# Patient Record
Sex: FEMALE | Race: ASIAN | NOT HISPANIC OR LATINO
[De-identification: names, ages, dates, MRNs, and addresses within clinical notes are randomized per-mention and may not be internally consistent; named-entity substitution may affect disease eponyms.]

---

## 2023-06-27 PROBLEM — Z00.00 ENCOUNTER FOR PREVENTIVE HEALTH EXAMINATION: Status: ACTIVE | Noted: 2023-06-27

## 2023-07-11 ENCOUNTER — APPOINTMENT (OUTPATIENT)
Dept: SURGICAL ONCOLOGY | Facility: CLINIC | Age: 43
End: 2023-07-11
Payer: MEDICAID

## 2023-07-11 VITALS
DIASTOLIC BLOOD PRESSURE: 85 MMHG | TEMPERATURE: 98 F | HEART RATE: 90 BPM | WEIGHT: 113 LBS | HEIGHT: 62.2 IN | OXYGEN SATURATION: 99 % | BODY MASS INDEX: 20.53 KG/M2 | SYSTOLIC BLOOD PRESSURE: 147 MMHG | RESPIRATION RATE: 18 BRPM

## 2023-07-11 DIAGNOSIS — Z78.9 OTHER SPECIFIED HEALTH STATUS: ICD-10-CM

## 2023-07-11 PROCEDURE — 99245 OFF/OP CONSLTJ NEW/EST HI 55: CPT

## 2023-07-11 NOTE — HISTORY OF PRESENT ILLNESS
[de-identified] : Ms. Lopez is a 42 y/o female who developed right sided abdominal pain with nausea, but without associated emesis/fever.\par Initial concern was possible biliary colic.\par Patient underwent CT abdomen/pelvis 06/23/2023 which demonstrated thickening in the ascending colon, measuring 4.2 cm,  concerning for malignant mass or inflammatory process.  There was no evidence of distant metastasis if malignant mass.\par \par Colonoscopy 06/28/2023 showed an apple core lesion in the ascending colon for which colonoscope could not traverse.  Biopsy and tattoo was performed.\par Pathology: superficial fragments of tubulovillous adenoma, but deeper adenocarcinoma could not be excluded.\par CEA is <0.6 ng/mL.\par \par CT chest 07/06/2023 shows no evidence of thoracic metastatic disease.\par \par She is referred for surgical evaluation.\par Patient currently feels well and is tolerating diet without abdominal bloating, nausea/emesis.  She has not had weight loss.

## 2023-07-11 NOTE — CONSULT LETTER
[Dear  ___] : Dear  [unfilled], [Consult Letter:] : I had the pleasure of evaluating your patient, [unfilled]. [Please see my note below.] : Please see my note below. [Consult Closing:] : Thank you very much for allowing me to participate in the care of this patient.  If you have any questions, please do not hesitate to contact me. [Sincerely,] : Sincerely, [FreeTextEntry2] : Dr. Harry Kidd [FreeTextEntry3] : Quinn Kearns\par (M) [phone]\par (O) [phone] [DrPop  ___] : Dr. SALAZAR

## 2023-07-11 NOTE — REASON FOR VISIT
[Initial Consultation] : an initial consultation for [Referred By: ___] : Referred By: [unfilled] [FreeTextEntry2] : ascending colon mass

## 2023-07-11 NOTE — ASSESSMENT
[FreeTextEntry1] : 42 y/o female presents with near obstructing ascending colon mass.\par I suspect there is an underlying adenocarcinoma.\par No evidence of distant metastasis.\par There is concern for impending obstruction.\par \par Plan: Recommend surgical resection - right colectomy.  She is a candidate for minimally invasive robotic approach.  She will need preoperative bowel preparation.\par Risks/benefits/operative details and expected post-operative recovery discussed exhaustively.\par All question answered.\par She wishes to proceed and I will expedite surgical date this month.

## 2023-07-12 RX ORDER — NEOMYCIN SULFATE 500 MG/1
500 TABLET ORAL
Qty: 6 | Refills: 0 | Status: ACTIVE | COMMUNITY
Start: 2023-07-12 | End: 1900-01-01

## 2023-07-12 RX ORDER — POTASSIUM CHLORIDE 1500 MG/1
20 TABLET, FILM COATED, EXTENDED RELEASE ORAL
Qty: 4 | Refills: 0 | Status: ACTIVE | COMMUNITY
Start: 2023-07-12 | End: 1900-01-01

## 2023-07-12 RX ORDER — METRONIDAZOLE 250 MG/1
250 TABLET ORAL
Qty: 3 | Refills: 0 | Status: ACTIVE | COMMUNITY
Start: 2023-07-12 | End: 1900-01-01

## 2023-07-16 ENCOUNTER — TRANSCRIPTION ENCOUNTER (OUTPATIENT)
Age: 43
End: 2023-07-16

## 2023-07-17 ENCOUNTER — RESULT REVIEW (OUTPATIENT)
Age: 43
End: 2023-07-17

## 2023-07-17 ENCOUNTER — OUTPATIENT (OUTPATIENT)
Dept: OUTPATIENT SERVICES | Facility: HOSPITAL | Age: 43
LOS: 1 days | End: 2023-07-17
Payer: COMMERCIAL

## 2023-07-17 DIAGNOSIS — K63.5 POLYP OF COLON: ICD-10-CM

## 2023-07-17 DIAGNOSIS — K64.8 OTHER HEMORRHOIDS: ICD-10-CM

## 2023-07-17 DIAGNOSIS — R10.30 LOWER ABDOMINAL PAIN, UNSPECIFIED: ICD-10-CM

## 2023-07-17 DIAGNOSIS — K62.1 RECTAL POLYP: ICD-10-CM

## 2023-07-17 DIAGNOSIS — R93.3 ABNORMAL FINDINGS ON DIAGNOSTIC IMAGING OF OTHER PARTS OF DIGESTIVE TRACT: ICD-10-CM

## 2023-07-17 PROCEDURE — 88321 CONSLTJ&REPRT SLD PREP ELSWR: CPT

## 2023-07-19 ENCOUNTER — OUTPATIENT (OUTPATIENT)
Dept: OUTPATIENT SERVICES | Facility: HOSPITAL | Age: 43
LOS: 1 days | End: 2023-07-19

## 2023-07-19 VITALS
OXYGEN SATURATION: 99 % | RESPIRATION RATE: 18 BRPM | WEIGHT: 115.96 LBS | SYSTOLIC BLOOD PRESSURE: 14 MMHG | DIASTOLIC BLOOD PRESSURE: 84 MMHG | HEART RATE: 87 BPM | TEMPERATURE: 97 F | HEIGHT: 64 IN

## 2023-07-19 DIAGNOSIS — Z98.891 HISTORY OF UTERINE SCAR FROM PREVIOUS SURGERY: Chronic | ICD-10-CM

## 2023-07-19 DIAGNOSIS — K63.89 OTHER SPECIFIED DISEASES OF INTESTINE: ICD-10-CM

## 2023-07-19 LAB
A1C WITH ESTIMATED AVERAGE GLUCOSE RESULT: 5.1 % — SIGNIFICANT CHANGE UP (ref 4–5.6)
ALBUMIN SERPL ELPH-MCNC: 4.5 G/DL — SIGNIFICANT CHANGE UP (ref 3.3–5)
ALP SERPL-CCNC: 52 U/L — SIGNIFICANT CHANGE UP (ref 40–120)
ALT FLD-CCNC: 10 U/L — SIGNIFICANT CHANGE UP (ref 4–33)
ANION GAP SERPL CALC-SCNC: 13 MMOL/L — SIGNIFICANT CHANGE UP (ref 7–14)
AST SERPL-CCNC: 12 U/L — SIGNIFICANT CHANGE UP (ref 4–32)
BILIRUB SERPL-MCNC: 0.3 MG/DL — SIGNIFICANT CHANGE UP (ref 0.2–1.2)
BLD GP AB SCN SERPL QL: NEGATIVE — SIGNIFICANT CHANGE UP
BUN SERPL-MCNC: 18 MG/DL — SIGNIFICANT CHANGE UP (ref 7–23)
CALCIUM SERPL-MCNC: 9.6 MG/DL — SIGNIFICANT CHANGE UP (ref 8.4–10.5)
CHLORIDE SERPL-SCNC: 102 MMOL/L — SIGNIFICANT CHANGE UP (ref 98–107)
CO2 SERPL-SCNC: 27 MMOL/L — SIGNIFICANT CHANGE UP (ref 22–31)
CREAT SERPL-MCNC: 0.54 MG/DL — SIGNIFICANT CHANGE UP (ref 0.5–1.3)
EGFR: 116 ML/MIN/1.73M2 — SIGNIFICANT CHANGE UP
ESTIMATED AVERAGE GLUCOSE: 100 — SIGNIFICANT CHANGE UP
GLUCOSE SERPL-MCNC: 126 MG/DL — HIGH (ref 70–99)
HCG SERPL-ACNC: <1 MIU/ML — SIGNIFICANT CHANGE UP
HCT VFR BLD CALC: 36.8 % — SIGNIFICANT CHANGE UP (ref 34.5–45)
HGB BLD-MCNC: 11.5 G/DL — SIGNIFICANT CHANGE UP (ref 11.5–15.5)
MCHC RBC-ENTMCNC: 27.4 PG — SIGNIFICANT CHANGE UP (ref 27–34)
MCHC RBC-ENTMCNC: 31.3 GM/DL — LOW (ref 32–36)
MCV RBC AUTO: 87.8 FL — SIGNIFICANT CHANGE UP (ref 80–100)
NRBC # BLD: 0 /100 WBCS — SIGNIFICANT CHANGE UP (ref 0–0)
NRBC # FLD: 0 K/UL — SIGNIFICANT CHANGE UP (ref 0–0)
PLATELET # BLD AUTO: 286 K/UL — SIGNIFICANT CHANGE UP (ref 150–400)
POTASSIUM SERPL-MCNC: 4.4 MMOL/L — SIGNIFICANT CHANGE UP (ref 3.5–5.3)
POTASSIUM SERPL-SCNC: 4.4 MMOL/L — SIGNIFICANT CHANGE UP (ref 3.5–5.3)
PROT SERPL-MCNC: 7 G/DL — SIGNIFICANT CHANGE UP (ref 6–8.3)
RBC # BLD: 4.19 M/UL — SIGNIFICANT CHANGE UP (ref 3.8–5.2)
RBC # FLD: 14.2 % — SIGNIFICANT CHANGE UP (ref 10.3–14.5)
RH IG SCN BLD-IMP: POSITIVE — SIGNIFICANT CHANGE UP
SODIUM SERPL-SCNC: 142 MMOL/L — SIGNIFICANT CHANGE UP (ref 135–145)
SURGICAL PATHOLOGY STUDY: SIGNIFICANT CHANGE UP
WBC # BLD: 6.2 K/UL — SIGNIFICANT CHANGE UP (ref 3.8–10.5)
WBC # FLD AUTO: 6.2 K/UL — SIGNIFICANT CHANGE UP (ref 3.8–10.5)

## 2023-07-19 RX ORDER — CHLORHEXIDINE GLUCONATE 213 G/1000ML
1 SOLUTION TOPICAL DAILY
Refills: 0 | Status: DISCONTINUED | OUTPATIENT
Start: 2023-07-27 | End: 2023-07-28

## 2023-07-19 NOTE — H&P PST ADULT - PROBLEM SELECTOR PLAN 1
Schedule for Robotic Partial Colectomy on 7/27/23  Pre-op instructions provided. Pt given verbal and written instructions with teach back on chlorhexidine shampoo and Pepcid. Pt verbalized understanding with return demonstration.   Pending labs

## 2023-07-19 NOTE — H&P PST ADULT - HISTORY OF PRESENT ILLNESS
42 y/o female presents for preop evaluation for Robotic Partial Colectomy tentatively for 7/27/23.   Pt has been c/o right abdominal pain; had ct scan of abdomen & pelvis done and intestinal mass seen.

## 2023-07-19 NOTE — H&P PST ADULT - RESPIRATORY
clear to auscultation bilaterally/no wheezes/no rales/no rhonchi/no respiratory distress/airway patent/breath sounds equal

## 2023-07-19 NOTE — H&P PST ADULT - OPHTHALMOLOGIC
----- Message from Purvi Carrera sent at 2/21/2018  4:51 PM CST -----  Contact: [facility]Stefanycristal Mccall is requesting the Clinical notes; order for PT , medical necessity form, and location of PT to be sent to her. Contact: [phone]   details…

## 2023-07-26 ENCOUNTER — TRANSCRIPTION ENCOUNTER (OUTPATIENT)
Age: 43
End: 2023-07-26

## 2023-07-27 ENCOUNTER — RESULT REVIEW (OUTPATIENT)
Age: 43
End: 2023-07-27

## 2023-07-27 ENCOUNTER — INPATIENT (INPATIENT)
Facility: HOSPITAL | Age: 43
LOS: 2 days | Discharge: HOME | End: 2023-07-30
Attending: SURGERY | Admitting: SURGERY
Payer: MEDICAID

## 2023-07-27 ENCOUNTER — TRANSCRIPTION ENCOUNTER (OUTPATIENT)
Age: 43
End: 2023-07-27

## 2023-07-27 ENCOUNTER — APPOINTMENT (OUTPATIENT)
Dept: SURGICAL ONCOLOGY | Facility: HOSPITAL | Age: 43
End: 2023-07-27

## 2023-07-27 VITALS
DIASTOLIC BLOOD PRESSURE: 89 MMHG | WEIGHT: 115.96 LBS | HEART RATE: 101 BPM | SYSTOLIC BLOOD PRESSURE: 137 MMHG | RESPIRATION RATE: 14 BRPM | OXYGEN SATURATION: 99 % | HEIGHT: 64 IN | TEMPERATURE: 98 F

## 2023-07-27 DIAGNOSIS — K63.89 OTHER SPECIFIED DISEASES OF INTESTINE: ICD-10-CM

## 2023-07-27 DIAGNOSIS — Z98.891 HISTORY OF UTERINE SCAR FROM PREVIOUS SURGERY: Chronic | ICD-10-CM

## 2023-07-27 LAB
GLUCOSE BLDC GLUCOMTR-MCNC: 89 MG/DL — SIGNIFICANT CHANGE UP (ref 70–99)
HCG UR QL: NEGATIVE — SIGNIFICANT CHANGE UP
RH IG SCN BLD-IMP: POSITIVE — SIGNIFICANT CHANGE UP

## 2023-07-27 PROCEDURE — S2900 ROBOTIC SURGICAL SYSTEM: CPT | Mod: NC

## 2023-07-27 PROCEDURE — 44204 LAPARO PARTIAL COLECTOMY: CPT | Mod: AS

## 2023-07-27 PROCEDURE — 88341 IMHCHEM/IMCYTCHM EA ADD ANTB: CPT | Mod: 26

## 2023-07-27 PROCEDURE — 88342 IMHCHEM/IMCYTCHM 1ST ANTB: CPT | Mod: 26

## 2023-07-27 PROCEDURE — 44205 LAP COLECTOMY PART W/ILEUM: CPT

## 2023-07-27 PROCEDURE — 49905 OMENTAL FLAP INTRA-ABDOM: CPT

## 2023-07-27 PROCEDURE — 44140 PARTIAL REMOVAL OF COLON: CPT | Mod: AS

## 2023-07-27 PROCEDURE — 88313 SPECIAL STAINS GROUP 2: CPT | Mod: 26

## 2023-07-27 PROCEDURE — 44204 LAPARO PARTIAL COLECTOMY: CPT

## 2023-07-27 PROCEDURE — 49905 OMENTAL FLAP INTRA-ABDOM: CPT | Mod: AS

## 2023-07-27 PROCEDURE — 88309 TISSUE EXAM BY PATHOLOGIST: CPT | Mod: 26

## 2023-07-27 DEVICE — STAPLER COVIDIEN TRI-STAPLE 60MM PURPLE RELOAD: Type: IMPLANTABLE DEVICE | Status: FUNCTIONAL

## 2023-07-27 DEVICE — LIGATING CLIPS WECK HEMOLOK POLYMER LARGE (PURPLE) 6: Type: IMPLANTABLE DEVICE | Status: FUNCTIONAL

## 2023-07-27 DEVICE — STAPLER COVIDIEN TRI-STAPLE 60MM PURPLE INTELLIGENT RELOAD: Type: IMPLANTABLE DEVICE | Status: FUNCTIONAL

## 2023-07-27 DEVICE — SURGICEL POWDER 3 GRAMS: Type: IMPLANTABLE DEVICE | Status: FUNCTIONAL

## 2023-07-27 DEVICE — STAPLER COVIDIEN ENDO GIA 45MM GREY RELOAD: Type: IMPLANTABLE DEVICE | Status: FUNCTIONAL

## 2023-07-27 RX ORDER — FENTANYL CITRATE 50 UG/ML
50 INJECTION INTRAVENOUS
Refills: 0 | Status: DISCONTINUED | OUTPATIENT
Start: 2023-07-27 | End: 2023-07-27

## 2023-07-27 RX ORDER — OXYCODONE HYDROCHLORIDE 5 MG/1
5 TABLET ORAL EVERY 6 HOURS
Refills: 0 | Status: DISCONTINUED | OUTPATIENT
Start: 2023-07-27 | End: 2023-07-30

## 2023-07-27 RX ORDER — HYDROMORPHONE HYDROCHLORIDE 2 MG/ML
0.5 INJECTION INTRAMUSCULAR; INTRAVENOUS; SUBCUTANEOUS
Refills: 0 | Status: DISCONTINUED | OUTPATIENT
Start: 2023-07-27 | End: 2023-07-27

## 2023-07-27 RX ORDER — ACETAMINOPHEN 500 MG
1000 TABLET ORAL EVERY 6 HOURS
Refills: 0 | Status: COMPLETED | OUTPATIENT
Start: 2023-07-28 | End: 2023-07-28

## 2023-07-27 RX ORDER — ONDANSETRON 8 MG/1
4 TABLET, FILM COATED ORAL ONCE
Refills: 0 | Status: DISCONTINUED | OUTPATIENT
Start: 2023-07-27 | End: 2023-07-27

## 2023-07-27 RX ORDER — ENOXAPARIN SODIUM 100 MG/ML
40 INJECTION SUBCUTANEOUS EVERY 24 HOURS
Refills: 0 | Status: DISCONTINUED | OUTPATIENT
Start: 2023-07-28 | End: 2023-07-30

## 2023-07-27 RX ORDER — OXYCODONE HYDROCHLORIDE 5 MG/1
5 TABLET ORAL ONCE
Refills: 0 | Status: DISCONTINUED | OUTPATIENT
Start: 2023-07-27 | End: 2023-07-27

## 2023-07-27 RX ORDER — SODIUM CHLORIDE 9 MG/ML
1000 INJECTION, SOLUTION INTRAVENOUS
Refills: 0 | Status: DISCONTINUED | OUTPATIENT
Start: 2023-07-27 | End: 2023-07-28

## 2023-07-27 RX ORDER — OMEGA-3 ACID ETHYL ESTERS 1 G
0 CAPSULE ORAL
Refills: 0 | DISCHARGE

## 2023-07-27 RX ORDER — OXYCODONE HYDROCHLORIDE 5 MG/1
10 TABLET ORAL EVERY 6 HOURS
Refills: 0 | Status: DISCONTINUED | OUTPATIENT
Start: 2023-07-27 | End: 2023-07-30

## 2023-07-27 RX ADMIN — HYDROMORPHONE HYDROCHLORIDE 0.5 MILLIGRAM(S): 2 INJECTION INTRAMUSCULAR; INTRAVENOUS; SUBCUTANEOUS at 19:10

## 2023-07-27 RX ADMIN — HYDROMORPHONE HYDROCHLORIDE 0.5 MILLIGRAM(S): 2 INJECTION INTRAMUSCULAR; INTRAVENOUS; SUBCUTANEOUS at 18:55

## 2023-07-27 RX ADMIN — SODIUM CHLORIDE 75 MILLILITER(S): 9 INJECTION, SOLUTION INTRAVENOUS at 21:00

## 2023-07-27 NOTE — PATIENT PROFILE ADULT - FALL HARM RISK - HARM RISK INTERVENTIONS

## 2023-07-27 NOTE — BRIEF OPERATIVE NOTE - ASSISTANT(S)
Dr. Delgado Florian First assistant : María Johnson PA-C   SEcond assistants Dr. Robledo, Dr. Natarajan

## 2023-07-27 NOTE — BRIEF OPERATIVE NOTE - OPERATION/FINDINGS
Operation: Laparoscopic, robotic assisted, right hemicolectomy    Description: Optiview entry at [place]. 8mm ports x3 and 12mm Air Seal placed in pelvis along prior Pfannenstiel. R colon dissected caudal to cranial starting w/ white line of Toldt and carrying dissection along medial aspect toward ileocolic pedicle. Ileocolic vessels isolated and divided w/ 35mm Gray Endo NOMI stapler. R colon mesentery divided towards the R colic vessels and divided w/ 35mm Gray Endo NOMI stapler. TI divided w/ 60mm reinforced Purple Endo NOMI stapler. Transverse colon divided w/ 60mm reinforced Purple Endo NOMI stapler distal to tattoo at hepatic flexure. Colon and SB aligned for functional end to end, side to side ileocolic anastomosis w/ 3-0 Silk stay sutures. Enterotomies created and anastomosis created w/ 60mm Purple Endo NOMI stapler. Common enterotomy closed w/ 3-0 V-maylin suture. Rest of R colon attachments divided w/ vessel sealer. Ports removed. Specimen externalized through Pfannenstiel incision. Pfannenstiel fascial incision closed w/ 0 Vicryl figure of eight sutures. Skin closed w/ buried 3-0 Vicryl and 4-0 Monocryl sutures.

## 2023-07-27 NOTE — BRIEF OPERATIVE NOTE - COMMENTS
I, Janell Johnson PA-C, served as the first assistant in this operation. I assisted in placing ports, docking, and targeting the da José robot, first assisted at the surgical field while the surgeon was performing the operation at the robotic console by providing instrument exchanges, tissue retraction, suction and irrigation, specimen retrieval, passing and removing sutures and sponges, undocking the robotic platform, and closed surgical wounds.

## 2023-07-27 NOTE — CHART NOTE - NSCHARTNOTEFT_GEN_A_CORE
Surgery Post-Op Note    Procedure: Robot-assisted laparoscopic right hemicolectomy    Surgeon: Dr. Kearns    SUBJECTIVE:  Pt seen and examined at the bedside. Pt with minimal pain with controlled with medication. Denies F/C/N/V. Denies chest pain or SOB.     OBJECTIVE:  Vital Signs Last 24 Hrs  T(C): 36.9 (27 Jul 2023 22:00), Max: 37.4 (27 Jul 2023 20:45)  T(F): 98.5 (27 Jul 2023 22:00), Max: 99.3 (27 Jul 2023 20:45)  HR: 90 (27 Jul 2023 22:00) (81 - 101)  BP: 121/71 (27 Jul 2023 22:00) (106/66 - 137/89)  BP(mean): 81 (27 Jul 2023 22:00) (62 - 85)  RR: 20 (27 Jul 2023 22:00) (14 - 23)  SpO2: 98% (27 Jul 2023 22:00) (96% - 100%)    Parameters below as of 27 Jul 2023 22:00  Patient On (Oxygen Delivery Method): room air    Physical Exam:  General: NAD, resting comfortably in bed  Neuro: A&O x 3, no focal deficits  Pulmonary: Nonlabored breathing, no respiratory distress  Cardiovascular: NSR  Abdominal: soft, nondistended, NTTP. No rebound or guarding. Dressing in place over trocar sites c/d/i  Extremities: WWP    ASSESSMENT:43y Female now 4hours s/p Robot-assisted laparoscopic right hemicolectomy    PLAN:  - Pain control  - Eid  - Diet, CLD

## 2023-07-28 ENCOUNTER — TRANSCRIPTION ENCOUNTER (OUTPATIENT)
Age: 43
End: 2023-07-28

## 2023-07-28 LAB
ANION GAP SERPL CALC-SCNC: 11 MMOL/L — SIGNIFICANT CHANGE UP (ref 7–14)
BUN SERPL-MCNC: 6 MG/DL — LOW (ref 7–23)
CALCIUM SERPL-MCNC: 8.6 MG/DL — SIGNIFICANT CHANGE UP (ref 8.4–10.5)
CHLORIDE SERPL-SCNC: 103 MMOL/L — SIGNIFICANT CHANGE UP (ref 98–107)
CO2 SERPL-SCNC: 22 MMOL/L — SIGNIFICANT CHANGE UP (ref 22–31)
CREAT SERPL-MCNC: 0.43 MG/DL — LOW (ref 0.5–1.3)
EGFR: 122 ML/MIN/1.73M2 — SIGNIFICANT CHANGE UP
GLUCOSE SERPL-MCNC: 91 MG/DL — SIGNIFICANT CHANGE UP (ref 70–99)
HCT VFR BLD CALC: 33.6 % — LOW (ref 34.5–45)
HGB BLD-MCNC: 10.5 G/DL — LOW (ref 11.5–15.5)
MAGNESIUM SERPL-MCNC: 1.8 MG/DL — SIGNIFICANT CHANGE UP (ref 1.6–2.6)
MCHC RBC-ENTMCNC: 27.3 PG — SIGNIFICANT CHANGE UP (ref 27–34)
MCHC RBC-ENTMCNC: 31.3 GM/DL — LOW (ref 32–36)
MCV RBC AUTO: 87.5 FL — SIGNIFICANT CHANGE UP (ref 80–100)
NRBC # BLD: 0 /100 WBCS — SIGNIFICANT CHANGE UP (ref 0–0)
NRBC # FLD: 0 K/UL — SIGNIFICANT CHANGE UP (ref 0–0)
PHOSPHATE SERPL-MCNC: 3.4 MG/DL — SIGNIFICANT CHANGE UP (ref 2.5–4.5)
PLATELET # BLD AUTO: 225 K/UL — SIGNIFICANT CHANGE UP (ref 150–400)
POTASSIUM SERPL-MCNC: 4 MMOL/L — SIGNIFICANT CHANGE UP (ref 3.5–5.3)
POTASSIUM SERPL-SCNC: 4 MMOL/L — SIGNIFICANT CHANGE UP (ref 3.5–5.3)
RBC # BLD: 3.84 M/UL — SIGNIFICANT CHANGE UP (ref 3.8–5.2)
RBC # FLD: 13.8 % — SIGNIFICANT CHANGE UP (ref 10.3–14.5)
SODIUM SERPL-SCNC: 136 MMOL/L — SIGNIFICANT CHANGE UP (ref 135–145)
WBC # BLD: 12.66 K/UL — HIGH (ref 3.8–10.5)
WBC # FLD AUTO: 12.66 K/UL — HIGH (ref 3.8–10.5)

## 2023-07-28 RX ORDER — ACETAMINOPHEN 500 MG
2 TABLET ORAL
Qty: 0 | Refills: 0 | DISCHARGE
Start: 2023-07-28

## 2023-07-28 RX ORDER — OXYCODONE HYDROCHLORIDE 5 MG/1
1 TABLET ORAL
Qty: 12 | Refills: 0
Start: 2023-07-28 | End: 2023-07-31

## 2023-07-28 RX ADMIN — Medication 400 MILLIGRAM(S): at 00:31

## 2023-07-28 RX ADMIN — Medication 1000 MILLIGRAM(S): at 13:09

## 2023-07-28 RX ADMIN — Medication 400 MILLIGRAM(S): at 18:41

## 2023-07-28 RX ADMIN — Medication 1000 MILLIGRAM(S): at 19:00

## 2023-07-28 RX ADMIN — Medication 400 MILLIGRAM(S): at 12:57

## 2023-07-28 RX ADMIN — ENOXAPARIN SODIUM 40 MILLIGRAM(S): 100 INJECTION SUBCUTANEOUS at 09:37

## 2023-07-28 RX ADMIN — Medication 1000 MILLIGRAM(S): at 07:02

## 2023-07-28 RX ADMIN — OXYCODONE HYDROCHLORIDE 5 MILLIGRAM(S): 5 TABLET ORAL at 23:22

## 2023-07-28 RX ADMIN — Medication 400 MILLIGRAM(S): at 06:38

## 2023-07-28 RX ADMIN — Medication 1000 MILLIGRAM(S): at 01:00

## 2023-07-28 NOTE — DISCHARGE NOTE NURSING/CASE MANAGEMENT/SOCIAL WORK - PATIENT PORTAL LINK FT
You can access the FollowMyHealth Patient Portal offered by [facility] by registering at the following website: [email or web address]. By joining [facility]’s FollowMyHealth portal, you will also be able to view your health information using other applications (apps) compatible with our system.

## 2023-07-28 NOTE — DISCHARGE NOTE PROVIDER - NSDCCPTREATMENT_GEN_ALL_CORE_FT
PRINCIPAL PROCEDURE  Procedure: Robot-assisted laparoscopic right hemicolectomy  Findings and Treatment:

## 2023-07-28 NOTE — DISCHARGE NOTE PROVIDER - CARE PROVIDER_API CALL
Quinn Kearns-Yeou  Surgery  [address] [place] [id number]  Phone: [phone]  Fax: [phone]  Follow Up Time: 1 week

## 2023-07-28 NOTE — PROGRESS NOTE ADULT - ASSESSMENT
43F with an ascending colon mass s/p robotic right hemicolectomy 7/27. Recovering appropriately.    PLAN:  - Pain control PRN  - OOB/ Ambulate/ IS while in bed  - Clear liquid diet, if tolerates clears will advance to Low Residue Diet for dinner  - IVL  - VTE ppx: Lovenox  - Discharge planning: Home without needs once tolerating LRD    D Team Surgery  [id number]

## 2023-07-28 NOTE — PROGRESS NOTE ADULT - SUBJECTIVE AND OBJECTIVE BOX
D TEAM Surgery Progress Note  Patient is a 43y old  Female who presents with a chief complaint of Ascending colon mass (28 Jul 2023 08:31)    INTERVAL EVENTS: Patient is POD#1 s/p robotic right hemicolectomy. No acute events overnight.    SUBJECTIVE: Patient seen and examined at bedside with surgical team, patient without complaints. She reports "very little" pain. Has taken in only water since surgery. Denies nausea, vomiting. Passing flatus. Denies BM. Denies fever, chills, CP, SOB.    OBJECTIVE:    Vital Signs Last 24 Hrs  T(C): 37.2 (28 Jul 2023 06:00), Max: 37.4 (27 Jul 2023 20:45)  T(F): 99 (28 Jul 2023 06:00), Max: 99.3 (27 Jul 2023 20:45)  HR: 82 (28 Jul 2023 06:00) (81 - 101)  BP: 105/59 (28 Jul 2023 06:00) (104/69 - 137/89)  BP(mean): 81 (27 Jul 2023 22:00) (62 - 85)  RR: 16 (28 Jul 2023 06:00) (14 - 23)  SpO2: 100% (28 Jul 2023 06:00) (96% - 100%)    Parameters below as of 28 Jul 2023 06:00  Patient On (Oxygen Delivery Method): room air    I&O's Detail    27 Jul 2023 07:01  -  28 Jul 2023 07:00  --------------------------------------------------------  IN:    Lactated Ringers: 300 mL    Oral Fluid: 60 mL  Total IN: 360 mL    OUT:    Indwelling Catheter - Urethral (mL): 1625 mL  Total OUT: 1625 mL    Total NET: -1265 mL      MEDICATIONS  (STANDING):  acetaminophen   IVPB .. 1000 milliGRAM(s) IV Intermittent every 6 hours  enoxaparin Injectable 40 milliGRAM(s) SubCutaneous every 24 hours    MEDICATIONS  (PRN):  oxyCODONE    IR 5 milliGRAM(s) Oral every 6 hours PRN Moderate Pain (4 - 6)  oxyCODONE    IR 10 milliGRAM(s) Oral every 6 hours PRN Severe Pain (7 - 10)      PHYSICAL EXAM:  Constitutional: A&Ox3, NAD  Respiratory: Unlabored breathing  Abdomen: Soft, nondistended, NTTP. No rebound or guarding. Port sites and Pfannenstiel incision C/D/I.  Extremities: WWP, LARSON spontaneously    LABS:                        10.5   12.66 )-----------( 225      ( 28 Jul 2023 05:49 )             33.6     07-28    136  |  103  |  6<L>  ----------------------------<  91  4.0   |  22  |  0.43<L>    Ca    8.6      28 Jul 2023 05:49  Phos  3.4     07-28  Mg     1.80     07-28          Urinalysis Basic - ( 28 Jul 2023 05:49 )    Color: x / Appearance: x / SG: x / pH: x  Gluc: 91 mg/dL / Ketone: x  / Bili: x / Urobili: x   Blood: x / Protein: x / Nitrite: x   Leuk Esterase: x / RBC: x / WBC x   Sq Epi: x / Non Sq Epi: x / Bacteria: x      ABO Interpretation: RANGEL (07-27-23 @ 10:45)

## 2023-07-28 NOTE — DISCHARGE NOTE PROVIDER - NSDCCPCAREPLAN_GEN_ALL_CORE_FT
PRINCIPAL DISCHARGE DIAGNOSIS  Diagnosis: Mass of colon  Assessment and Plan of Treatment: WOUND CARE:  Please keep incisions clean and dry. Please do not Scrub or rub incisions. Do not use lotion or powder on incisions.   BATHING: You may shower and/or sponge bathe. You may use warm soapy water in the shower and rinse, pat dry.  ACTIVITY: No heavy lifting or straining. Otherwise, you may return to your usual level of physical activity. If you are taking narcotic pain medication DO NOT drive a car, operate machinery or make important decisions.  DIET: Return to your usual diet.  NOTIFY YOUR SURGEON IF YOU HAVE: any bleeding that does not stop, any pus draining from your wound(s), any fever (over 100.4 F) persistent nausea/vomiting, or if your pain is not controlled on your discharge pain medications, unable to urinate.  FOLLOW UP:  1. Please follow up with your primary care physician in one week regarding your hospitalization, bring copies of your discharge paperwork.  2. Please follow up with your surgeon, Dr. Kearns. Call [phone] to make an appointment.

## 2023-07-28 NOTE — DISCHARGE NOTE NURSING/CASE MANAGEMENT/SOCIAL WORK - NSDCPNINST_GEN_ALL_CORE
Notify Dr Kearns if you experience any increase in pain not relieved with medication, any redness, drainage or swelling around incision, any persistent nausea vomititng or any fever >100.5.  Drink plenty of fluids. no heavy lifting or straining.   Notify Dr Kearns if you experience any increase in pain not relieved with medication, any redness, drainage or swelling around incision, any persistent nausea vomiting or any fever >100.5.  Drink plenty of fluids. No heavy lifting or straining.  Use over the counter stool softeners to assist with constipation which can be a side effect of narcotic pain medication.

## 2023-07-28 NOTE — DISCHARGE NOTE PROVIDER - HOSPITAL COURSE
This patient is a 43F with an ascending colon mass who presented to [facility] on 7/27/23 for scheduled surgery. Patient taken to the OR by Dr. Kearns and underwent robotic right hemicolectomy. Patient tolerated operation well and there were no post-operative complications identified. Patient remained hemodynamically stable in the PACU and transferred to the surgical floor. Diet was restarted and advanced as tolerated. Pain control was transitioned from IV to PO pain meds. At this time, patient is currently ambulating, voiding, tolerating a regular diet. Pain well controlled on PO pain meds. Patient has been deemed stable for discharge home with follow up as an outpatient.     iSTOP Reference #: [id number]

## 2023-07-28 NOTE — DISCHARGE NOTE PROVIDER - NSDCMRMEDTOKEN_GEN_ALL_CORE_FT
fish oil:   mvi daily:   oxyCODONE 5 mg oral tablet: 1 tab(s) orally every 6 hours as needed for  severe pain MDD: 4  Tylenol Extra Strength 500 mg oral tablet: 2 tab(s) orally every 6 hours as needed for  mild pain   fish oil:   ibuprofen 400 mg oral tablet: 1 tab(s) orally every 8 hours  mvi daily:   oxyCODONE 5 mg oral tablet: 1 tab(s) orally every 6 hours as needed for  severe pain MDD: 4  Tylenol Extra Strength 500 mg oral tablet: 2 tab(s) orally every 6 hours as needed for  moderate pain

## 2023-07-29 LAB
ANION GAP SERPL CALC-SCNC: 12 MMOL/L — SIGNIFICANT CHANGE UP (ref 7–14)
BUN SERPL-MCNC: 5 MG/DL — LOW (ref 7–23)
CALCIUM SERPL-MCNC: 8.5 MG/DL — SIGNIFICANT CHANGE UP (ref 8.4–10.5)
CHLORIDE SERPL-SCNC: 104 MMOL/L — SIGNIFICANT CHANGE UP (ref 98–107)
CO2 SERPL-SCNC: 24 MMOL/L — SIGNIFICANT CHANGE UP (ref 22–31)
CREAT SERPL-MCNC: 0.44 MG/DL — LOW (ref 0.5–1.3)
EGFR: 121 ML/MIN/1.73M2 — SIGNIFICANT CHANGE UP
GLUCOSE SERPL-MCNC: 103 MG/DL — HIGH (ref 70–99)
HCT VFR BLD CALC: 32.8 % — LOW (ref 34.5–45)
HGB BLD-MCNC: 10.3 G/DL — LOW (ref 11.5–15.5)
MAGNESIUM SERPL-MCNC: 2 MG/DL — SIGNIFICANT CHANGE UP (ref 1.6–2.6)
MCHC RBC-ENTMCNC: 27.5 PG — SIGNIFICANT CHANGE UP (ref 27–34)
MCHC RBC-ENTMCNC: 31.4 GM/DL — LOW (ref 32–36)
MCV RBC AUTO: 87.5 FL — SIGNIFICANT CHANGE UP (ref 80–100)
NRBC # BLD: 0 /100 WBCS — SIGNIFICANT CHANGE UP (ref 0–0)
NRBC # FLD: 0 K/UL — SIGNIFICANT CHANGE UP (ref 0–0)
PHOSPHATE SERPL-MCNC: 2.4 MG/DL — LOW (ref 2.5–4.5)
PLATELET # BLD AUTO: 207 K/UL — SIGNIFICANT CHANGE UP (ref 150–400)
POTASSIUM SERPL-MCNC: 4.2 MMOL/L — SIGNIFICANT CHANGE UP (ref 3.5–5.3)
POTASSIUM SERPL-SCNC: 4.2 MMOL/L — SIGNIFICANT CHANGE UP (ref 3.5–5.3)
RBC # BLD: 3.75 M/UL — LOW (ref 3.8–5.2)
RBC # FLD: 13.9 % — SIGNIFICANT CHANGE UP (ref 10.3–14.5)
SODIUM SERPL-SCNC: 140 MMOL/L — SIGNIFICANT CHANGE UP (ref 135–145)
WBC # BLD: 7.68 K/UL — SIGNIFICANT CHANGE UP (ref 3.8–10.5)
WBC # FLD AUTO: 7.68 K/UL — SIGNIFICANT CHANGE UP (ref 3.8–10.5)

## 2023-07-29 RX ORDER — ACETAMINOPHEN 500 MG
1000 TABLET ORAL EVERY 6 HOURS
Refills: 0 | Status: DISCONTINUED | OUTPATIENT
Start: 2023-07-29 | End: 2023-07-30

## 2023-07-29 RX ORDER — IBUPROFEN 200 MG
400 TABLET ORAL EVERY 8 HOURS
Refills: 0 | Status: DISCONTINUED | OUTPATIENT
Start: 2023-07-29 | End: 2023-07-30

## 2023-07-29 RX ADMIN — OXYCODONE HYDROCHLORIDE 5 MILLIGRAM(S): 5 TABLET ORAL at 07:31

## 2023-07-29 RX ADMIN — OXYCODONE HYDROCHLORIDE 5 MILLIGRAM(S): 5 TABLET ORAL at 13:02

## 2023-07-29 RX ADMIN — Medication 1000 MILLIGRAM(S): at 22:15

## 2023-07-29 RX ADMIN — OXYCODONE HYDROCHLORIDE 5 MILLIGRAM(S): 5 TABLET ORAL at 17:45

## 2023-07-29 RX ADMIN — Medication 400 MILLIGRAM(S): at 19:12

## 2023-07-29 RX ADMIN — ENOXAPARIN SODIUM 40 MILLIGRAM(S): 100 INJECTION SUBCUTANEOUS at 12:05

## 2023-07-29 RX ADMIN — OXYCODONE HYDROCHLORIDE 5 MILLIGRAM(S): 5 TABLET ORAL at 06:51

## 2023-07-29 RX ADMIN — OXYCODONE HYDROCHLORIDE 5 MILLIGRAM(S): 5 TABLET ORAL at 00:20

## 2023-07-29 RX ADMIN — Medication 1000 MILLIGRAM(S): at 21:10

## 2023-07-29 RX ADMIN — OXYCODONE HYDROCHLORIDE 5 MILLIGRAM(S): 5 TABLET ORAL at 18:30

## 2023-07-29 RX ADMIN — OXYCODONE HYDROCHLORIDE 5 MILLIGRAM(S): 5 TABLET ORAL at 12:30

## 2023-07-29 NOTE — PROGRESS NOTE ADULT - SUBJECTIVE AND OBJECTIVE BOX
[facility] Daily Resident Progress Note    Reason for admission:  Robot-assisted laparoscopic right hemicolectomy    Robot-assisted laparoscopic right hemicolectomy        SUBJECTIVE: Pt seen and examined at bedside. +/- chest pain, SOB, N/V, flatus, BM, UOP, pain.     OBJECTIVE:  Vital Signs Last 24 Hrs  T(C): 36.9 (29 Jul 2023 13:15), Max: 37.2 (28 Jul 2023 22:04)  T(F): 98.5 (29 Jul 2023 13:15), Max: 98.9 (28 Jul 2023 22:04)  HR: 71 (29 Jul 2023 13:15) (67 - 84)  BP: 121/66 (29 Jul 2023 13:15) (100/61 - 128/62)  BP(mean): --  RR: 16 (29 Jul 2023 13:15) (16 - 17)  SpO2: 95% (29 Jul 2023 13:15) (95% - 100%)    Parameters below as of 29 Jul 2023 13:15  Patient On (Oxygen Delivery Method): room air        I&O's Summary    28 Jul 2023 07:01  -  29 Jul 2023 07:00  --------------------------------------------------------  IN: 1320 mL / OUT: 2200 mL / NET: -880 mL    29 Jul 2023 07:01  -  29 Jul 2023 16:30  --------------------------------------------------------  IN: 0 mL / OUT: 700 mL / NET: -700 mL        Allergies:  No Known Allergies      Medications:  MEDICATIONS  (STANDING):  enoxaparin Injectable 40 milliGRAM(s) SubCutaneous every 24 hours    MEDICATIONS  (PRN):  oxyCODONE    IR 5 milliGRAM(s) Oral every 6 hours PRN Moderate Pain (4 - 6)  oxyCODONE    IR 10 milliGRAM(s) Oral every 6 hours PRN Severe Pain (7 - 10)      Physical Exam:  General Appearance: Appears well, NAD, A&Ox3  Chest: Equal expansion bilaterally, no increased WOB.   CV: RRR, WWP.   Abdomen:   Extremities: No swelling, asymmetry, or gross deformity appreciated.     Labs:                        10.3   7.68  )-----------( 207      ( 29 Jul 2023 05:29 )             32.8     07-29    140  |  104  |  5<L>  ----------------------------<  103<H>  4.2   |  24  |  0.44<L>    Ca    8.5      29 Jul 2023 05:29  Phos  2.4     07-29  Mg     2.00     07-29        Urinalysis Basic - ( 29 Jul 2023 05:29 )    Color: x / Appearance: x / SG: x / pH: x  Gluc: 103 mg/dL / Ketone: x  / Bili: x / Urobili: x   Blood: x / Protein: x / Nitrite: x   Leuk Esterase: x / RBC: x / WBC x   Sq Epi: x / Non Sq Epi: x / Bacteria: x      CAPILLARY BLOOD GLUCOSE          Microbiology:      Radiology & additional studies:           D TEAM Surgery Daily Resident Progress Note    Reason for admission:  Robot-assisted laparoscopic right hemicolectomy    Overnight events: No acute events    SUBJECTIVE: Pt seen and examined at bedside. Denies chest pain, no SOB, no N/V, +flatus, BM, pain in incisional site, not eating much.     OBJECTIVE:  Vital Signs Last 24 Hrs  T(C): 36.9 (29 Jul 2023 13:15), Max: 37.2 (28 Jul 2023 22:04)  T(F): 98.5 (29 Jul 2023 13:15), Max: 98.9 (28 Jul 2023 22:04)  HR: 71 (29 Jul 2023 13:15) (67 - 84)  BP: 121/66 (29 Jul 2023 13:15) (100/61 - 128/62)  BP(mean): --  RR: 16 (29 Jul 2023 13:15) (16 - 17)  SpO2: 95% (29 Jul 2023 13:15) (95% - 100%)    Parameters below as of 29 Jul 2023 13:15  Patient On (Oxygen Delivery Method): room air        I&O's Summary    28 Jul 2023 07:01  -  29 Jul 2023 07:00  --------------------------------------------------------  IN: 1320 mL / OUT: 2200 mL / NET: -880 mL    29 Jul 2023 07:01  -  29 Jul 2023 16:30  --------------------------------------------------------  IN: 0 mL / OUT: 700 mL / NET: -700 mL        Allergies:  No Known Allergies      Medications:  MEDICATIONS  (STANDING):  enoxaparin Injectable 40 milliGRAM(s) SubCutaneous every 24 hours    MEDICATIONS  (PRN):  oxyCODONE    IR 5 milliGRAM(s) Oral every 6 hours PRN Moderate Pain (4 - 6)  oxyCODONE    IR 10 milliGRAM(s) Oral every 6 hours PRN Severe Pain (7 - 10)      Physical Exam:  General Appearance: Appears well, NAD, A&Ox3  Chest: Equal expansion bilaterally, no increased WOB.   CV: RRR, WWP.   Abdomen: soft, nontender, slightly distended, no rigidity at palpation.   Extremities: No swelling, asymmetry, or gross deformity appreciated.     Labs:                        10.3   7.68  )-----------( 207      ( 29 Jul 2023 05:29 )             32.8     07-29    140  |  104  |  5<L>  ----------------------------<  103<H>  4.2   |  24  |  0.44<L>    Ca    8.5      29 Jul 2023 05:29  Phos  2.4     07-29  Mg     2.00     07-29        Urinalysis Basic - ( 29 Jul 2023 05:29 )    Color: x / Appearance: x / SG: x / pH: x  Gluc: 103 mg/dL / Ketone: x  / Bili: x / Urobili: x   Blood: x / Protein: x / Nitrite: x   Leuk Esterase: x / RBC: x / WBC x   Sq Epi: x / Non Sq Epi: x / Bacteria: x      CAPILLARY BLOOD GLUCOSE          Microbiology:      Radiology & additional studies:

## 2023-07-29 NOTE — PROGRESS NOTE ADULT - ASSESSMENT
43F with an ascending colon mass s/p robotic right hemicolectomy 7/27. Recovering appropriately.    PLAN:  - Pain control PRN  - OOB/ Ambulate/ IS while in bed  - Clear liquid diet, if tolerates clears will advance to Low Residue Diet for dinner  - IVL  - VTE ppx: Lovenox  - Discharge planning: Home without needs once tolerating LRD    D Team Surgery  [id number]  43F with an ascending colon mass s/p robotic right hemicolectomy 7/27. Recovering appropriately.    PLAN:  - Pain control PRN  - OOB/ Ambulate/ IS while in bed  - Regular diet  - VTE ppx: Lovenox  - Discharge planning: Home without needs once tolerating LRD    D Team Surgery [id number]

## 2023-07-30 VITALS
RESPIRATION RATE: 16 BRPM | OXYGEN SATURATION: 98 % | TEMPERATURE: 98 F | HEART RATE: 87 BPM | DIASTOLIC BLOOD PRESSURE: 81 MMHG | SYSTOLIC BLOOD PRESSURE: 123 MMHG

## 2023-07-30 LAB
ANION GAP SERPL CALC-SCNC: 8 MMOL/L — SIGNIFICANT CHANGE UP (ref 7–14)
BUN SERPL-MCNC: 5 MG/DL — LOW (ref 7–23)
CALCIUM SERPL-MCNC: 8.4 MG/DL — SIGNIFICANT CHANGE UP (ref 8.4–10.5)
CHLORIDE SERPL-SCNC: 106 MMOL/L — SIGNIFICANT CHANGE UP (ref 98–107)
CO2 SERPL-SCNC: 26 MMOL/L — SIGNIFICANT CHANGE UP (ref 22–31)
CREAT SERPL-MCNC: 0.41 MG/DL — LOW (ref 0.5–1.3)
EGFR: 124 ML/MIN/1.73M2 — SIGNIFICANT CHANGE UP
GLUCOSE SERPL-MCNC: 97 MG/DL — SIGNIFICANT CHANGE UP (ref 70–99)
HCT VFR BLD CALC: 33.4 % — LOW (ref 34.5–45)
HGB BLD-MCNC: 10.4 G/DL — LOW (ref 11.5–15.5)
MCHC RBC-ENTMCNC: 28 PG — SIGNIFICANT CHANGE UP (ref 27–34)
MCHC RBC-ENTMCNC: 31.1 GM/DL — LOW (ref 32–36)
MCV RBC AUTO: 90 FL — SIGNIFICANT CHANGE UP (ref 80–100)
NRBC # BLD: 0 /100 WBCS — SIGNIFICANT CHANGE UP (ref 0–0)
NRBC # FLD: 0 K/UL — SIGNIFICANT CHANGE UP (ref 0–0)
PLATELET # BLD AUTO: 215 K/UL — SIGNIFICANT CHANGE UP (ref 150–400)
POTASSIUM SERPL-MCNC: 4 MMOL/L — SIGNIFICANT CHANGE UP (ref 3.5–5.3)
POTASSIUM SERPL-SCNC: 4 MMOL/L — SIGNIFICANT CHANGE UP (ref 3.5–5.3)
RBC # BLD: 3.71 M/UL — LOW (ref 3.8–5.2)
RBC # FLD: 14 % — SIGNIFICANT CHANGE UP (ref 10.3–14.5)
SODIUM SERPL-SCNC: 140 MMOL/L — SIGNIFICANT CHANGE UP (ref 135–145)
WBC # BLD: 7.14 K/UL — SIGNIFICANT CHANGE UP (ref 3.8–10.5)
WBC # FLD AUTO: 7.14 K/UL — SIGNIFICANT CHANGE UP (ref 3.8–10.5)

## 2023-07-30 RX ORDER — IBUPROFEN 200 MG
1 TABLET ORAL
Qty: 0 | Refills: 0 | DISCHARGE
Start: 2023-07-30

## 2023-07-30 RX ORDER — ACETAMINOPHEN 500 MG
2 TABLET ORAL
Qty: 0 | Refills: 0 | DISCHARGE
Start: 2023-07-30

## 2023-07-30 RX ADMIN — Medication 400 MILLIGRAM(S): at 13:29

## 2023-07-30 RX ADMIN — Medication 1000 MILLIGRAM(S): at 05:44

## 2023-07-30 RX ADMIN — ENOXAPARIN SODIUM 40 MILLIGRAM(S): 100 INJECTION SUBCUTANEOUS at 09:27

## 2023-07-30 RX ADMIN — Medication 1000 MILLIGRAM(S): at 04:46

## 2023-07-30 RX ADMIN — Medication 400 MILLIGRAM(S): at 05:14

## 2023-07-30 RX ADMIN — Medication 400 MILLIGRAM(S): at 06:01

## 2023-07-30 NOTE — PROGRESS NOTE ADULT - ASSESSMENT
43F PMH ascending colon mass POD3 s/p robotic right hemicolectomy 7/27. Recovering appropriately. Tolerating regular diet. Discharge today    PLAN:  - Pain control PRN  - OOB/ Ambulate/ IS while in bed  - Regular diet  - VTE ppx: Lovenox  - Discharge planning: Discharge to home now    D Team Surgery [id number]     Case seen and discussed with Dr. Moreau

## 2023-07-30 NOTE — PROGRESS NOTE ADULT - SUBJECTIVE AND OBJECTIVE BOX
TEAM [ D ] Surgery Daily Progress Note  =====================================================    SUBJECTIVE: Patient seen and examined at bedside on AM rounds. Patient reports that they're feeling well. Tolerating regular diet, denies nausea, vomiting. + Flatus/ +BM. OOB/Amublating as tolerated. Denies fever, chills.    PMH:   POD 3 s/p robotic right hemicolectomy    ALLERGIES:  No Known Allergies      --------------------------------------------------------------------------------------    MEDICATIONS:    Neurologic Medications  acetaminophen     Tablet .. 1000 milliGRAM(s) Oral every 6 hours  ibuprofen  Tablet. 400 milliGRAM(s) Oral every 8 hours  oxyCODONE    IR 5 milliGRAM(s) Oral every 6 hours PRN Moderate Pain (4 - 6)  oxyCODONE    IR 10 milliGRAM(s) Oral every 6 hours PRN Severe Pain (7 - 10)    Respiratory Medications    Cardiovascular Medications    Gastrointestinal Medications    Genitourinary Medications    Hematologic/Oncologic Medications  enoxaparin Injectable 40 milliGRAM(s) SubCutaneous every 24 hours    Antimicrobial/Immunologic Medications    Endocrine/Metabolic Medications    Topical/Other Medications    --------------------------------------------------------------------------------------    VITAL SIGNS:    Vital Signs Last 24 Hrs  T(C): 36.6 (30 Jul 2023 09:01), Max: 37.1 (30 Jul 2023 05:43)  T(F): 97.9 (30 Jul 2023 09:01), Max: 98.7 (30 Jul 2023 05:43)  HR: 87 (30 Jul 2023 09:01) (60 - 87)  BP: 123/81 (30 Jul 2023 09:01) (100/58 - 140/70)  BP(mean): --  RR: 16 (30 Jul 2023 09:01) (16 - 17)  SpO2: 98% (30 Jul 2023 09:01) (95% - 100%)    Parameters below as of 30 Jul 2023 09:01  Patient On (Oxygen Delivery Method): room air      --------------------------------------------------------------------------------------    EXAM    General: NAD, resting in bed comfortably.  Cardiac: regular rate, warm and well perfused  Respiratory: Nonlabored respirations, normal cw expansion.  Abdomen: soft, nontender, nondistended. Robotic steristrip sites c/d/i  Extremities: normal strength, FROM, no deformities    --------------------------------------------------------------------------------------    LABS                          10.4   7.14  )-----------( 215      ( 30 Jul 2023 05:37 )             33.4   07-30    140  |  106  |  5<L>  ----------------------------<  97  4.0   |  26  |  0.41<L>    Ca    8.4      30 Jul 2023 05:37  Phos  2.4     07-29  Mg     2.00     07-29        --------------------------------------------------------------------------------------    INS AND OUTS:    I&O's Summary    29 Jul 2023 07:01  -  30 Jul 2023 07:00  --------------------------------------------------------  IN: 0 mL / OUT: 950 mL / NET: -950 mL      --------------------------------------------------------------------------------------

## 2023-08-08 ENCOUNTER — APPOINTMENT (OUTPATIENT)
Dept: SURGICAL ONCOLOGY | Facility: CLINIC | Age: 43
End: 2023-08-08
Payer: MEDICAID

## 2023-08-08 VITALS
BODY MASS INDEX: 21.08 KG/M2 | TEMPERATURE: 98 F | DIASTOLIC BLOOD PRESSURE: 83 MMHG | OXYGEN SATURATION: 100 % | RESPIRATION RATE: 18 BRPM | HEART RATE: 93 BPM | WEIGHT: 116 LBS | SYSTOLIC BLOOD PRESSURE: 123 MMHG

## 2023-08-08 DIAGNOSIS — K63.89 OTHER SPECIFIED DISEASES OF INTESTINE: ICD-10-CM

## 2023-08-08 DIAGNOSIS — C18.2 MALIGNANT NEOPLASM OF ASCENDING COLON: ICD-10-CM

## 2023-08-08 LAB — SURGICAL PATHOLOGY STUDY: SIGNIFICANT CHANGE UP

## 2023-08-08 PROCEDURE — 99024 POSTOP FOLLOW-UP VISIT: CPT

## 2023-08-09 PROBLEM — C18.2 MALIGNANT NEOPLASM OF ASCENDING COLON: Status: ACTIVE | Noted: 2023-08-09

## 2023-08-09 PROBLEM — K63.89 MASS OF COLON: Status: ACTIVE | Noted: 2023-07-06

## 2023-08-09 NOTE — HISTORY OF PRESENT ILLNESS
[de-identified] : Ms. Lopez is a 44 y/o female who developed right sided abdominal pain with nausea, but without associated emesis/fever. Initial concern was possible biliary colic. Patient underwent CT abdomen/pelvis 06/23/2023 which demonstrated thickening in the ascending colon, measuring 4.2 cm,  concerning for malignant mass or inflammatory process.  There was no evidence of distant metastasis if malignant mass.  Colonoscopy 06/28/2023 showed an apple core lesion in the ascending colon for which colonoscope could not traverse.  Biopsy and tattoo was performed. Pathology: superficial fragments of tubulovillous adenoma, but deeper adenocarcinoma could not be excluded. CEA is <0.6 ng/mL.  CT chest 07/06/2023 shows no evidence of thoracic metastatic disease.  She is referred for surgical evaluation. Patient currently feels well and is tolerating diet without abdominal bloating, nausea/emesis.  She has not had weight loss.  08/08/2023: Patient is s/p robotic right colectomy 07/27/2023.  She reports recovering well with minimal abdominal/incisional pain.  She is tolerating diet and reports regular bowel movements.  She is planning on travelling to [place] in one week. Pathology: pT3N0 (0/41) moderately differentiated adenocarcinoma of the ascending colon with suspicion for large vessel venous invasion, all margins negative.

## 2023-08-09 NOTE — ASSESSMENT
[FreeTextEntry1] : Recovering well after robotic right colectomy for Stage 2 adenocarcinoma of the ascending colon. Diet and activity as tolerated. Medical oncology referral. Follow up in one month.

## 2023-08-22 PROBLEM — Z78.9 OTHER SPECIFIED HEALTH STATUS: Chronic | Status: ACTIVE | Noted: 2023-07-19

## 2024-11-03 NOTE — ASU PREOP CHECKLIST - NS PREOP CHK HIBICLENS NA
You can access the FollowMyHealth Patient Portal offered by [facility] by registering at the following website: [email or web address]. By joining [facility]’s FollowMyHealth portal, you will also be able to view your health information using other applications (apps) compatible with our system. #1:

## (undated) DEVICE — XI ARM NEEDLE DRIVER LARGE

## (undated) DEVICE — XI TIP COVER

## (undated) DEVICE — ELCTR BOVIE PENCIL SMOKE EVACUATION

## (undated) DEVICE — POSITIONER PURPLE ARM ONE STEP (LARGE)

## (undated) DEVICE — SPECIMEN CONTAINER 100ML

## (undated) DEVICE — XI ARM CLIP APPLIER LARGE

## (undated) DEVICE — XI DRAPE ARM

## (undated) DEVICE — XI ARM FORCEP FENESTRATED BIPOLAR 8MM

## (undated) DEVICE — SUT VICRYL 0 27" UR-6

## (undated) DEVICE — SUT MONOCRYL 4-0 27" PS-2 UNDYED

## (undated) DEVICE — GLV 7 PROTEXIS (WHITE)

## (undated) DEVICE — TUBING AIRSEAL TRI-LUMEN FILTERED

## (undated) DEVICE — VENODYNE/SCD SLEEVE CALF MEDIUM

## (undated) DEVICE — FOLEY TRAY 16FR 5CC LTX UMETER CLOSED

## (undated) DEVICE — PACK ROBOTIC LIJ

## (undated) DEVICE — POSITIONER FOAM HEAD CRADLE (PINK)

## (undated) DEVICE — XI OBTURATOR OPTICAL BLADELESS 8MM

## (undated) DEVICE — XI ARM FORCEP MARYLAND BIPOLAR

## (undated) DEVICE — XI ARM PERMANENT CAUTERY HOOK

## (undated) DEVICE — XI DRAPE COLUMN

## (undated) DEVICE — XI ARM FORCEP CADIERE 8MM

## (undated) DEVICE — XI ARM PERMANENT CAUTERY SPATULA

## (undated) DEVICE — WARMING BLANKET UPPER ADULT

## (undated) DEVICE — SOL IRR POUR H2O 250ML

## (undated) DEVICE — MARKING PEN W RULER

## (undated) DEVICE — XI ARM FORCEP PROGRASP 8MM

## (undated) DEVICE — PREP CHLORAPREP HI-LITE ORANGE 26ML

## (undated) DEVICE — POSITIONER FOAM EGG CRATE ULNAR 2PCS (PINK)

## (undated) DEVICE — XI VESSEL SEALER

## (undated) DEVICE — XI ARM DISSECTOR CURVED BIPOLAR 8MM

## (undated) DEVICE — BLADE SCALPEL SAFETYLOCK #10

## (undated) DEVICE — D HELP - CLEARVIEW CLEARIFY SYSTEM

## (undated) DEVICE — WARMING BLANKET LOWER ADULT

## (undated) DEVICE — TUBING SUCTION 20FT

## (undated) DEVICE — XI ARM GRASPER TIP UP FENESTRATED

## (undated) DEVICE — SOL IRR POUR NS 0.9% 500ML

## (undated) DEVICE — VENODYNE/SCD SLEEVE FOOT

## (undated) DEVICE — TROCAR ETHICON ENDOPATH XCEL BLADELESS 5MM X 100MM STABILITY

## (undated) DEVICE — XI ARM SCISSOR MONO CURVED

## (undated) DEVICE — XI ARM FORCEP TENACULUM

## (undated) DEVICE — STAPLER COVIDIEN ENDO GIA STANDARD HANDLE

## (undated) DEVICE — TUBING STRYKEFLOW II SUCTION / IRRIGATOR

## (undated) DEVICE — XI SEAL UNIV 5- 8 MM

## (undated) DEVICE — ELCTR BOVIE TIP BLADE INSULATED 2.75" EDGE

## (undated) DEVICE — GLV 7.5 PROTEXIS (WHITE)

## (undated) DEVICE — TROCAR SURGIQUEST AIRSEAL 12MMX100MM

## (undated) DEVICE — XI ARM CLIP APPLIER MEDIUM-LARGE